# Patient Record
Sex: FEMALE | Race: WHITE | HISPANIC OR LATINO | Employment: PART TIME | URBAN - METROPOLITAN AREA
[De-identification: names, ages, dates, MRNs, and addresses within clinical notes are randomized per-mention and may not be internally consistent; named-entity substitution may affect disease eponyms.]

---

## 2017-02-02 ENCOUNTER — ALLSCRIPTS OFFICE VISIT (OUTPATIENT)
Dept: OTHER | Facility: OTHER | Age: 47
End: 2017-02-02

## 2017-02-02 DIAGNOSIS — R00.2 PALPITATIONS: ICD-10-CM

## 2017-02-02 DIAGNOSIS — M79.10 MYALGIA: ICD-10-CM

## 2017-02-02 DIAGNOSIS — E55.9 VITAMIN D DEFICIENCY: ICD-10-CM

## 2017-02-03 ENCOUNTER — APPOINTMENT (OUTPATIENT)
Dept: LAB | Facility: HOSPITAL | Age: 47
End: 2017-02-03

## 2017-02-03 ENCOUNTER — GENERIC CONVERSION - ENCOUNTER (OUTPATIENT)
Dept: OTHER | Facility: OTHER | Age: 47
End: 2017-02-03

## 2017-02-03 ENCOUNTER — TRANSCRIBE ORDERS (OUTPATIENT)
Dept: LAB | Facility: HOSPITAL | Age: 47
End: 2017-02-03

## 2017-02-03 DIAGNOSIS — R00.2 PALPITATIONS: ICD-10-CM

## 2017-02-03 DIAGNOSIS — E55.9 VITAMIN D DEFICIENCY: ICD-10-CM

## 2017-02-03 DIAGNOSIS — M79.10 MYALGIA: ICD-10-CM

## 2017-02-03 LAB
25(OH)D3 SERPL-MCNC: 15.4 NG/ML (ref 30–100)
ALBUMIN SERPL BCP-MCNC: 3.7 G/DL (ref 3.5–5)
ALP SERPL-CCNC: 85 U/L (ref 46–116)
ALT SERPL W P-5'-P-CCNC: 26 U/L (ref 12–78)
ANION GAP SERPL CALCULATED.3IONS-SCNC: 6 MMOL/L (ref 4–13)
AST SERPL W P-5'-P-CCNC: 14 U/L (ref 5–45)
BASOPHILS # BLD AUTO: 0.03 THOUSANDS/ΜL (ref 0–0.1)
BASOPHILS NFR BLD AUTO: 0 % (ref 0–1)
BILIRUB SERPL-MCNC: 0.31 MG/DL (ref 0.2–1)
BUN SERPL-MCNC: 10 MG/DL (ref 5–25)
CALCIUM SERPL-MCNC: 8.4 MG/DL (ref 8.3–10.1)
CHLORIDE SERPL-SCNC: 103 MMOL/L (ref 100–108)
CO2 SERPL-SCNC: 28 MMOL/L (ref 21–32)
CREAT SERPL-MCNC: 0.57 MG/DL (ref 0.6–1.3)
EOSINOPHIL # BLD AUTO: 0.14 THOUSAND/ΜL (ref 0–0.61)
EOSINOPHIL NFR BLD AUTO: 2 % (ref 0–6)
ERYTHROCYTE [DISTWIDTH] IN BLOOD BY AUTOMATED COUNT: 14.3 % (ref 11.6–15.1)
GFR SERPL CREATININE-BSD FRML MDRD: >60 ML/MIN/1.73SQ M
GLUCOSE SERPL-MCNC: 89 MG/DL (ref 65–140)
HCT VFR BLD AUTO: 36.8 % (ref 34.8–46.1)
HGB BLD-MCNC: 12 G/DL (ref 11.5–15.4)
LYMPHOCYTES # BLD AUTO: 1.72 THOUSANDS/ΜL (ref 0.6–4.47)
LYMPHOCYTES NFR BLD AUTO: 22 % (ref 14–44)
MAGNESIUM SERPL-MCNC: 2 MG/DL (ref 1.6–2.6)
MCH RBC QN AUTO: 24.7 PG (ref 26.8–34.3)
MCHC RBC AUTO-ENTMCNC: 32.6 G/DL (ref 31.4–37.4)
MCV RBC AUTO: 76 FL (ref 82–98)
MONOCYTES # BLD AUTO: 0.4 THOUSAND/ΜL (ref 0.17–1.22)
MONOCYTES NFR BLD AUTO: 5 % (ref 4–12)
NEUTROPHILS # BLD AUTO: 5.7 THOUSANDS/ΜL (ref 1.85–7.62)
NEUTS SEG NFR BLD AUTO: 71 % (ref 43–75)
NRBC BLD AUTO-RTO: 0 /100 WBCS
PLATELET # BLD AUTO: 231 THOUSANDS/UL (ref 149–390)
PMV BLD AUTO: 11.1 FL (ref 8.9–12.7)
POTASSIUM SERPL-SCNC: 3.9 MMOL/L (ref 3.5–5.3)
PROT SERPL-MCNC: 7.7 G/DL (ref 6.4–8.2)
RBC # BLD AUTO: 4.86 MILLION/UL (ref 3.81–5.12)
SODIUM SERPL-SCNC: 137 MMOL/L (ref 136–145)
TSH SERPL DL<=0.05 MIU/L-ACNC: 2.51 UIU/ML (ref 0.36–3.74)
WBC # BLD AUTO: 8.01 THOUSAND/UL (ref 4.31–10.16)

## 2017-02-03 PROCEDURE — 85025 COMPLETE CBC W/AUTO DIFF WBC: CPT

## 2017-02-03 PROCEDURE — 83735 ASSAY OF MAGNESIUM: CPT

## 2017-02-03 PROCEDURE — 36415 COLL VENOUS BLD VENIPUNCTURE: CPT

## 2017-02-03 PROCEDURE — 80053 COMPREHEN METABOLIC PANEL: CPT

## 2017-02-03 PROCEDURE — 84443 ASSAY THYROID STIM HORMONE: CPT

## 2017-02-03 PROCEDURE — 82306 VITAMIN D 25 HYDROXY: CPT

## 2017-02-13 ENCOUNTER — HOSPITAL ENCOUNTER (OUTPATIENT)
Dept: NON INVASIVE DIAGNOSTICS | Facility: HOSPITAL | Age: 47
End: 2017-02-13

## 2017-02-13 DIAGNOSIS — R00.2 PALPITATIONS: ICD-10-CM

## 2017-05-09 DIAGNOSIS — K21.9 GASTRO-ESOPHAGEAL REFLUX DISEASE WITHOUT ESOPHAGITIS: ICD-10-CM

## 2017-05-09 DIAGNOSIS — E55.9 VITAMIN D DEFICIENCY: ICD-10-CM

## 2017-05-09 DIAGNOSIS — I10 ESSENTIAL (PRIMARY) HYPERTENSION: ICD-10-CM

## 2017-05-09 DIAGNOSIS — D50.9 IRON DEFICIENCY ANEMIA: ICD-10-CM

## 2017-05-09 DIAGNOSIS — E66.9 OBESITY: ICD-10-CM

## 2017-05-09 DIAGNOSIS — E78.5 HYPERLIPIDEMIA: ICD-10-CM

## 2017-05-09 DIAGNOSIS — R25.2 CRAMP AND SPASM: ICD-10-CM

## 2017-05-09 DIAGNOSIS — R00.2 PALPITATIONS: ICD-10-CM

## 2017-05-10 ENCOUNTER — ALLSCRIPTS OFFICE VISIT (OUTPATIENT)
Dept: OTHER | Facility: OTHER | Age: 47
End: 2017-05-10

## 2017-06-10 ENCOUNTER — TRANSCRIBE ORDERS (OUTPATIENT)
Dept: LAB | Facility: HOSPITAL | Age: 47
End: 2017-06-10

## 2017-06-10 ENCOUNTER — APPOINTMENT (OUTPATIENT)
Dept: LAB | Facility: HOSPITAL | Age: 47
End: 2017-06-10
Payer: COMMERCIAL

## 2017-06-10 DIAGNOSIS — R00.2 PALPITATIONS: ICD-10-CM

## 2017-06-10 DIAGNOSIS — K21.9 GASTRO-ESOPHAGEAL REFLUX DISEASE WITHOUT ESOPHAGITIS: ICD-10-CM

## 2017-06-10 DIAGNOSIS — E55.9 VITAMIN D DEFICIENCY: ICD-10-CM

## 2017-06-10 DIAGNOSIS — R25.2 CRAMP AND SPASM: ICD-10-CM

## 2017-06-10 DIAGNOSIS — I10 ESSENTIAL (PRIMARY) HYPERTENSION: ICD-10-CM

## 2017-06-10 DIAGNOSIS — D50.9 IRON DEFICIENCY ANEMIA: ICD-10-CM

## 2017-06-10 DIAGNOSIS — E78.5 HYPERLIPIDEMIA: ICD-10-CM

## 2017-06-10 DIAGNOSIS — E66.9 OBESITY: ICD-10-CM

## 2017-06-10 LAB
ALBUMIN SERPL BCP-MCNC: 3.6 G/DL (ref 3.5–5)
ALP SERPL-CCNC: 89 U/L (ref 46–116)
ALT SERPL W P-5'-P-CCNC: 24 U/L (ref 12–78)
ANION GAP SERPL CALCULATED.3IONS-SCNC: 8 MMOL/L (ref 4–13)
AST SERPL W P-5'-P-CCNC: 17 U/L (ref 5–45)
BASOPHILS # BLD AUTO: 0.02 THOUSANDS/ΜL (ref 0–0.1)
BASOPHILS NFR BLD AUTO: 0 % (ref 0–1)
BILIRUB SERPL-MCNC: 0.27 MG/DL (ref 0.2–1)
BUN SERPL-MCNC: 12 MG/DL (ref 5–25)
CALCIUM SERPL-MCNC: 8.3 MG/DL (ref 8.3–10.1)
CHLORIDE SERPL-SCNC: 104 MMOL/L (ref 100–108)
CHOLEST SERPL-MCNC: 227 MG/DL (ref 50–200)
CO2 SERPL-SCNC: 26 MMOL/L (ref 21–32)
CREAT SERPL-MCNC: 0.58 MG/DL (ref 0.6–1.3)
EOSINOPHIL # BLD AUTO: 0.13 THOUSAND/ΜL (ref 0–0.61)
EOSINOPHIL NFR BLD AUTO: 2 % (ref 0–6)
ERYTHROCYTE [DISTWIDTH] IN BLOOD BY AUTOMATED COUNT: 14.4 % (ref 11.6–15.1)
EST. AVERAGE GLUCOSE BLD GHB EST-MCNC: 114 MG/DL
FERRITIN SERPL-MCNC: 11 NG/ML (ref 8–388)
GFR SERPL CREATININE-BSD FRML MDRD: >60 ML/MIN/1.73SQ M
GLUCOSE P FAST SERPL-MCNC: 96 MG/DL (ref 65–99)
HBA1C MFR BLD: 5.6 % (ref 4.2–6.3)
HCT VFR BLD AUTO: 36 % (ref 34.8–46.1)
HDLC SERPL-MCNC: 45 MG/DL (ref 40–60)
HGB BLD-MCNC: 11.9 G/DL (ref 11.5–15.4)
LDLC SERPL CALC-MCNC: 150 MG/DL (ref 0–100)
LYMPHOCYTES # BLD AUTO: 1.66 THOUSANDS/ΜL (ref 0.6–4.47)
LYMPHOCYTES NFR BLD AUTO: 24 % (ref 14–44)
MCH RBC QN AUTO: 25.3 PG (ref 26.8–34.3)
MCHC RBC AUTO-ENTMCNC: 33.1 G/DL (ref 31.4–37.4)
MCV RBC AUTO: 77 FL (ref 82–98)
MONOCYTES # BLD AUTO: 0.31 THOUSAND/ΜL (ref 0.17–1.22)
MONOCYTES NFR BLD AUTO: 4 % (ref 4–12)
NEUTROPHILS # BLD AUTO: 4.88 THOUSANDS/ΜL (ref 1.85–7.62)
NEUTS SEG NFR BLD AUTO: 70 % (ref 43–75)
NRBC BLD AUTO-RTO: 0 /100 WBCS
PLATELET # BLD AUTO: 224 THOUSANDS/UL (ref 149–390)
PMV BLD AUTO: 10.2 FL (ref 8.9–12.7)
POTASSIUM SERPL-SCNC: 3.7 MMOL/L (ref 3.5–5.3)
PROT SERPL-MCNC: 7.5 G/DL (ref 6.4–8.2)
RBC # BLD AUTO: 4.7 MILLION/UL (ref 3.81–5.12)
SODIUM SERPL-SCNC: 138 MMOL/L (ref 136–145)
TRIGL SERPL-MCNC: 160 MG/DL
WBC # BLD AUTO: 7.02 THOUSAND/UL (ref 4.31–10.16)

## 2017-06-10 PROCEDURE — 85025 COMPLETE CBC W/AUTO DIFF WBC: CPT

## 2017-06-10 PROCEDURE — 83036 HEMOGLOBIN GLYCOSYLATED A1C: CPT

## 2017-06-10 PROCEDURE — 80053 COMPREHEN METABOLIC PANEL: CPT

## 2017-06-10 PROCEDURE — 36415 COLL VENOUS BLD VENIPUNCTURE: CPT

## 2017-06-10 PROCEDURE — 80061 LIPID PANEL: CPT

## 2017-06-10 PROCEDURE — 82728 ASSAY OF FERRITIN: CPT

## 2017-07-05 ENCOUNTER — ALLSCRIPTS OFFICE VISIT (OUTPATIENT)
Dept: OTHER | Facility: OTHER | Age: 47
End: 2017-07-05

## 2017-10-07 ENCOUNTER — APPOINTMENT (OUTPATIENT)
Dept: LAB | Facility: HOSPITAL | Age: 47
End: 2017-10-07
Payer: COMMERCIAL

## 2017-10-07 ENCOUNTER — TRANSCRIBE ORDERS (OUTPATIENT)
Dept: LAB | Facility: HOSPITAL | Age: 47
End: 2017-10-07

## 2017-10-07 DIAGNOSIS — E66.9 LIFELONG OBESITY: ICD-10-CM

## 2017-10-07 DIAGNOSIS — E78.5 HYPERLIPIDEMIA, UNSPECIFIED HYPERLIPIDEMIA TYPE: ICD-10-CM

## 2017-10-07 DIAGNOSIS — R00.2 PALPITATIONS: ICD-10-CM

## 2017-10-07 DIAGNOSIS — I10 ESSENTIAL HYPERTENSION, MALIGNANT: ICD-10-CM

## 2017-10-07 DIAGNOSIS — R25.2 CRAMP OF LIMB: ICD-10-CM

## 2017-10-07 DIAGNOSIS — K21.9 GASTROESOPHAGEAL REFLUX DISEASE, ESOPHAGITIS PRESENCE NOT SPECIFIED: Primary | ICD-10-CM

## 2017-10-07 DIAGNOSIS — D50.9 NORMOCYTIC HYPOCHROMIC ANEMIA: ICD-10-CM

## 2017-10-07 DIAGNOSIS — K21.9 GASTROESOPHAGEAL REFLUX DISEASE, ESOPHAGITIS PRESENCE NOT SPECIFIED: ICD-10-CM

## 2017-10-07 DIAGNOSIS — E55.9 AVITAMINOSIS D: ICD-10-CM

## 2017-10-07 LAB
ALBUMIN SERPL BCP-MCNC: 3.7 G/DL (ref 3.5–5)
ALP SERPL-CCNC: 94 U/L (ref 46–116)
ALT SERPL W P-5'-P-CCNC: 23 U/L (ref 12–78)
ANION GAP SERPL CALCULATED.3IONS-SCNC: 7 MMOL/L (ref 4–13)
AST SERPL W P-5'-P-CCNC: 13 U/L (ref 5–45)
BASOPHILS # BLD AUTO: 0.02 THOUSANDS/ΜL (ref 0–0.1)
BASOPHILS NFR BLD AUTO: 0 % (ref 0–1)
BILIRUB SERPL-MCNC: 0.32 MG/DL (ref 0.2–1)
BUN SERPL-MCNC: 12 MG/DL (ref 5–25)
CALCIUM SERPL-MCNC: 8.4 MG/DL (ref 8.3–10.1)
CHLORIDE SERPL-SCNC: 104 MMOL/L (ref 100–108)
CHOLEST SERPL-MCNC: 232 MG/DL (ref 50–200)
CO2 SERPL-SCNC: 25 MMOL/L (ref 21–32)
CREAT SERPL-MCNC: 0.6 MG/DL (ref 0.6–1.3)
EOSINOPHIL # BLD AUTO: 0.11 THOUSAND/ΜL (ref 0–0.61)
EOSINOPHIL NFR BLD AUTO: 1 % (ref 0–6)
ERYTHROCYTE [DISTWIDTH] IN BLOOD BY AUTOMATED COUNT: 14.4 % (ref 11.6–15.1)
EST. AVERAGE GLUCOSE BLD GHB EST-MCNC: 120 MG/DL
FERRITIN SERPL-MCNC: 12 NG/ML (ref 8–388)
GFR SERPL CREATININE-BSD FRML MDRD: 111 ML/MIN/1.73SQ M
GLUCOSE P FAST SERPL-MCNC: 92 MG/DL (ref 65–99)
HBA1C MFR BLD: 5.8 % (ref 4.2–6.3)
HCT VFR BLD AUTO: 37.8 % (ref 34.8–46.1)
HDLC SERPL-MCNC: 46 MG/DL (ref 40–60)
HGB BLD-MCNC: 12.2 G/DL (ref 11.5–15.4)
LDLC SERPL CALC-MCNC: 157 MG/DL (ref 0–100)
LYMPHOCYTES # BLD AUTO: 1.77 THOUSANDS/ΜL (ref 0.6–4.47)
LYMPHOCYTES NFR BLD AUTO: 23 % (ref 14–44)
MCH RBC QN AUTO: 24.9 PG (ref 26.8–34.3)
MCHC RBC AUTO-ENTMCNC: 32.3 G/DL (ref 31.4–37.4)
MCV RBC AUTO: 77 FL (ref 82–98)
MONOCYTES # BLD AUTO: 0.34 THOUSAND/ΜL (ref 0.17–1.22)
MONOCYTES NFR BLD AUTO: 4 % (ref 4–12)
NEUTROPHILS # BLD AUTO: 5.6 THOUSANDS/ΜL (ref 1.85–7.62)
NEUTS SEG NFR BLD AUTO: 72 % (ref 43–75)
NRBC BLD AUTO-RTO: 0 /100 WBCS
PLATELET # BLD AUTO: 226 THOUSANDS/UL (ref 149–390)
PMV BLD AUTO: 10.5 FL (ref 8.9–12.7)
POTASSIUM SERPL-SCNC: 3.9 MMOL/L (ref 3.5–5.3)
PROT SERPL-MCNC: 7.8 G/DL (ref 6.4–8.2)
RBC # BLD AUTO: 4.89 MILLION/UL (ref 3.81–5.12)
SODIUM SERPL-SCNC: 136 MMOL/L (ref 136–145)
TRIGL SERPL-MCNC: 146 MG/DL
WBC # BLD AUTO: 7.87 THOUSAND/UL (ref 4.31–10.16)

## 2017-10-07 PROCEDURE — 80061 LIPID PANEL: CPT

## 2017-10-07 PROCEDURE — 83036 HEMOGLOBIN GLYCOSYLATED A1C: CPT

## 2017-10-07 PROCEDURE — 80053 COMPREHEN METABOLIC PANEL: CPT

## 2017-10-07 PROCEDURE — 82728 ASSAY OF FERRITIN: CPT

## 2017-10-07 PROCEDURE — 85025 COMPLETE CBC W/AUTO DIFF WBC: CPT

## 2017-10-07 PROCEDURE — 36415 COLL VENOUS BLD VENIPUNCTURE: CPT

## 2017-10-11 ENCOUNTER — ALLSCRIPTS OFFICE VISIT (OUTPATIENT)
Dept: OTHER | Facility: OTHER | Age: 47
End: 2017-10-11

## 2017-10-13 NOTE — PROGRESS NOTES
Assessment  1  Benign essential hypertension (401 1) (I10)   2  Obesity (278 00) (E66 9)   3  Hyperlipidemia (272 4) (E78 5)   · on lifestyle modifications, and will start omega 3  Wants to avoid statins at the moment  4  Iron deficiency anemia (280 9) (D50 9)   5  IFG (impaired fasting glucose) (790 21) (R73 01)    Plan  Acid reflux disease    · Omeprazole 20 MG Oral Capsule Delayed Release; TAKE ONE CAPSULE BY  MOUTH EVERY DAY AS NEEDED FOR REFLUX  Influenza vaccine needed    · Fluzone Quadrivalent 0 5 ML Intramuscular Suspension Prefilled Syringe  Myalgia    · Naproxen 500 MG Oral Tablet; take 1 tablet by mouth every 12 hours if needed  for pain with food  Obesity    · *1 - [facility]  *  Status: Active  Requested  for: 06GJW5355  Care Summary provided  : Yes    Discussion/Summary    Had a long discussion with patient about proper diet and exercise, encouraged weight loss and healthy habits  with patient other options for weight loss including medicines and Bariatric surgery  She is not interested in surgery at the moment but she is considering medications  refer patient to [facility] with management to see bariatric Dr Makenzie Lizarraga  flu shot today6 months  The patient was counseled regarding diagnostic results,-instructions for management,-risk factor reductions,-impressions,-risks and benefits of treatment options,-importance of compliance with treatment  total time of encounter was Thirty-five minutes  Possible side effects of new medications were reviewed with the patient/guardian today  The treatment plan was reviewed with the patient/guardian   The patient/guardian understands and agrees with the treatment plan      Chief Complaint  Patient here for 3 month follow up with labs would like the flu vac today      History of Present Illness  Patient presents for follow-up with labs  show prediabetes with an A1c of 5 8has continued to lose weightadmits not doing exercisedeficiency anemia is stable hemoglobin is normal despite stopping iron supplement due to GI upset  Ferritin is also improved from 6-12 now  She admits her menses recently are not heavy  has no acute issues or complaints, she states feeling well currently  Review of Systems    Constitutional: No fever, no chills, feels well, no tiredness, no recent weight gain or weight loss  Cardiovascular: No complaints of slow heart rate, no fast heart rate, no chest pain, no palpitations, no leg claudication, no lower extremity edema  Respiratory: No complaints of shortness of breath, no wheezing, no cough, no SOB on exertion, no orthopnea, no PND  Gastrointestinal: No complaints of abdominal pain, no constipation, no nausea or vomiting, no diarrhea, no bloody stools  Genitourinary: No complaints of dysuria, no incontinence, no pelvic pain, no dysmenorrhea, no vaginal discharge or bleeding  Musculoskeletal: No complaints of arthralgias, no myalgias, no joint swelling or stiffness, no limb pain or swelling  Active Problems  1  Acid reflux disease (530 81) (K21 9)   2  Benign essential hypertension (401 1) (I10)   3  Bilateral carpal tunnel syndrome (354 0) (G56 03)   4  Encounter for immunization (V03 89) (Z23)   5  Encounter for screening mammogram for malignant neoplasm of breast (V76 12)   (Z12 31)   6  External hemorrhoids (455 3) (K64 4)   7  Hand arthritis (716 94) (M19 049)   8  History of microcytic hypochromic anemia (V12 3) (Z86 2)   9  Hyperlipidemia (272 4) (E78 5)   10  Influenza vaccine needed (V04 81) (Z23)   11  Iron deficiency anemia (280 9) (D50 9)   12  Leg cramps (729 82) (R25 2)   13  Multiple acquired skin tags (701 9) (L91 8)   14  Myalgia (729 1) (M79 1)   15  Obesity (278 00) (E66 9)   16  Palpitations (785 1) (R00 2)   17  Plantar fasciitis, bilateral (728 71) (M72 2)   18  PND (post-nasal drip) (784 91) (R09 82)   19  Screening for diabetes mellitus (V77 1) (Z13 1)   20   Situational anxiety (300 09) (F41 8)   21  Vitamin D deficiency (268 9) (E55 9)   22  Well adult on routine health check (V70 0) (Z00 00)    Past Medical History  1  Acid reflux disease (530 81) (K21 9)   2  History of Acute upper respiratory infection (465 9) (J06 9)   3  History of Blood pressure elevated (401 9) (I10)   4  History of microcytic hypochromic anemia (V12 3) (Z86 2)   5  History of upper respiratory infection (V12 09) (Z87 09)   6  History of Impacted cerumen, unspecified laterality (380 4) (H61 20)   7  History of Joint pain, knee (719 46) (M25 569)   8  History of Limb pain (729 5) (M79 609)   9  History of Limb swelling (729 81) (M79 89)   10  History of Varicose Veins Of Lower Extremities (454 9)    The active problems and past medical history were reviewed and updated today  Surgical History  1  History of  Section    Family History  Mother    1  Family history of Diabetes Mellitus (V18 0)   2  Family history of Hypertension (V17 49)  Father    3  Family history of Leukemia (V16 6)  Paternal Aunt    3  Family history of Breast Cancer (V16 3)  Family History    5  Family history of malignant neoplasm of breast (V16 3) (Z80 3)    Social History   · History of Current Some Day Smoker (305 1)   ·    · Never a smoker   · Never Drank Alcohol   · Occupation: Homemaker  The social history was reviewed and is unchanged  Current Meds   1  Benazepril HCl - 5 MG Oral Tablet; TAKE 1 TABLET DAILY; Therapy: 50Ohf8650 to [phone])  Requested for: 86ODI0538; Last   MT:14AFE8482 Ordered   2  Fluticasone Propionate 50 MCG/ACT Nasal Suspension; use 2 sprays in each nostril   once daily; Therapy: 76TMR1477 to (Evaluate:12Eyk6296)  Requested for: 93Zza0499; Last   Rx:04Cfm1228 Ordered   3  Garcinia Cambogia-Chromium 500-200 MG-MCG Oral Tablet; Therapy: 11CBW6478 to Recorded   4  Naproxen 500 MG Oral Tablet; take 1 tablet by mouth every 12 hours if needed for pain   with food;    Therapy: 79KUA7563 to (Evaluate:50Cjm1077)  Requested for: 24Wpd2898; Last   Rx:01Jcq1200 Ordered   5  Omeprazole 20 MG Oral Capsule Delayed Release; TAKE ONE CAPSULE BY MOUTH   EVERY DAY AS NEEDED FOR REFLUX; Therapy: 29AWM7441 to (21 )  Requested for: 03UND8931; Last   II:13JJZ0802 Ordered   6  Ventolin  (90 Base) MCG/ACT Inhalation Aerosol Solution; Inhale 2 puffs every   4-6 hours as needed for wheeze/cough; Therapy: 43LSD9580 to (Last Rx:95Ywv8588)  Requested for: 62WBG4056 Ordered   7  Vitamin D 1000 UNIT Oral Tablet; TAKE 1 TABLET DAILY; Therapy: 83VOJ0711 to (Kris Arreguin)  Requested for: 13RZK9856; Last   Rx:23Zzn8204 Ordered    The medication list was reviewed and updated today  Allergies  1  Macrobid    Vitals  Vital Signs    Recorded: 76RXN8539 05:29PM   Temperature 98 4 F   Heart Rate 95   Respiration 16   Systolic 347   Diastolic 84   Height 5 ft 0 24 in   Weight 219 lb    BMI Calculated 42 43   BSA Calculated 1 95   O2 Saturation 98   Pain Scale 0     Physical Exam    Constitutional   General appearance: No acute distress, well appearing and well nourished  Pulmonary   Respiratory effort: No increased work of breathing or signs of respiratory distress  Auscultation of lungs: Clear to auscultation  Cardiovascular   Auscultation of heart: Normal rate and rhythm, normal S1 and S2, without murmurs  Examination of extremities for edema and/or varicosities: Normal     Musculoskeletal   Gait and station: Normal     Psychiatric   Mood and affect: Normal          Results/Data  (1) CBC/PLT/DIFF 07Oct2017 10:21AM Janice Ruff     Test Name Result Flag Reference   WBC COUNT 7 87 Thousand/uL  4 31-10 16   RBC COUNT 4 89 Million/uL  3 81-5 12   HEMOGLOBIN 12 2 g/dL  11 5-15 4   HEMATOCRIT 37 8 %  34 8-46  1   MCV 77 fL L 82-98   MCH 24 9 pg L 26 8-34 3   MCHC 32 3 g/dL  31 4-37 4   RDW 14 4 %  11 6-15 1   MPV 10 5 fL  8 9-12 7   PLATELET COUNT 513 Thousands/uL  149-390 nRBC AUTOMATED 0 /100 WBCs     NEUTROPHILS RELATIVE PERCENT 72 %  43-75   LYMPHOCYTES RELATIVE PERCENT 23 %  14-44   MONOCYTES RELATIVE PERCENT 4 %  4-12   EOSINOPHILS RELATIVE PERCENT 1 %  0-6   BASOPHILS RELATIVE PERCENT 0 %  0-1   NEUTROPHILS ABSOLUTE COUNT 5 60 Thousands/? ??L  1 85-7 62   LYMPHOCYTES ABSOLUTE COUNT 1 77 Thousands/? ??L  0 60-4 47   MONOCYTES ABSOLUTE COUNT 0 34 Thousand/? ??L  0 17-1 22   EOSINOPHILS ABSOLUTE COUNT 0 11 Thousand/? ??L  0 00-0 61   BASOPHILS ABSOLUTE COUNT 0 02 Thousands/? ??L  0 00-0 10   This is a patient instruction: This test is non-fasting  Please drink two glasses of water morning of bloodwork  (1) LIPID PANEL FASTING W DIRECT LDL REFLEX 07Oct2017 10:21AM Santi Valles     Test Name Result Flag Reference   CHOLESTEROL 232 mg/dL H    LDL CHOLESTEROL CALCULATED 157 mg/dL H 0-100   This is a patient instruction: This is a fasting test  Water, black tea or black coffee only after 9:00pm the night before the test  Drink 2 glasses of water the morning of the test         Triglyceride:        Normal <150 mg/dl   Borderline High 150-199 mg/dl   High 200-499 mg/dl   Very High >499 mg/dl      Cholesterol:       Desirable <200 mg/dl    Borderline High 200-239 mg/dl    High >239 mg/dl      HDL Cholesterol:       High>59 mg/dL    Low <41 mg/dL      HDL Cholesterol:       High>59 mg/dL    Low <41 mg/dL      This screening LDL is a calculated result  It does not have the accuracy of the Direct Measured LDL in the monitoring of patients with hyperlipidemia and/or statin therapy  Direct Measure LDL (OWG097) must be ordered separately in these patients  TRIGLYCERIDES 146 mg/dL  <=150   Specimen collection should occur prior to N-Acetylcysteine or Metamizole administration due to the potential for falsely depressed results  HDL,DIRECT 46 mg/dL  40-60   Specimen collection should occur prior to Metamizole administration due to the potential for falsley depressed results  (1) COMPREHENSIVE METABOLIC PANEL 52YZU4958 64:16UQ Andreialexi Krueger     Test Name Result Flag Reference   SODIUM 136 mmol/L  136-145   POTASSIUM 3 9 mmol/L  3 5-5 3   CHLORIDE 104 mmol/L  100-108   CARBON DIOXIDE 25 mmol/L  21-32   ANION GAP (CALC) 7 mmol/L  4-13   BLOOD UREA NITROGEN 12 mg/dL  5-25   CREATININE 0 60 mg/dL  0 60-1 30   Standardized to IDMS reference method   CALCIUM 8 4 mg/dL  8 3-10 1   BILI, TOTAL 0 32 mg/dL  0 20-1 00   ALK PHOSPHATAS 94 U/L     ALT (SGPT) 23 U/L  12-78   Specimen collection should occur prior to Sulfasalazine and/or Sulfapyridine administration due to the potential for falsely depressed results  AST(SGOT) 13 U/L  5-45   Specimen collection should occur prior to Sulfasalazine administration due to the potential for falsely depressed results  ALBUMIN 3 7 g/dL  3 5-5 0   TOTAL PROTEIN 7 8 g/dL  6 4-8 2   eGFR 111 ml/min/1 73sq [facility] [employer] Disease Education Program recommendations are as follows:  GFR calculation is accurate only with a steady state creatinine  Chronic Kidney disease less than 60 ml/min/1 73 sq  meters  Kidney failure less than 15 ml/min/1 73 sq  meters  GLUCOSE FASTING 92 mg/dL  65-99   Specimen collection should occur prior to Sulfasalazine administration due to the potential for falsely depressed results  Specimen collection should occur prior to Sulfapyridine administration due to the potential for falsely elevated results  (1) FERRITIN 15LKF9169 10:21AM Andreia Krueger     Test Name Result Flag Reference   FERRITIN 12 ng/mL  8-388     (1) HEMOGLOBIN A1C 25Mjj0210 10:21AM Janice Ruff     Test Name Result Flag Reference   HEMOGLOBIN A1C 5 8 %  4 2-6 3   EST  AVG  GLUCOSE 120 mg/dl         Future Appointments    Date/Time Provider Specialty Site   04/11/2018 05:30 PM OG Pan   Family Medicine [address]     Signatures   Electronically signed by : OG Zhao ; Oct 11 2017 7:13PM EST                       (Author)

## 2017-10-27 ENCOUNTER — ALLSCRIPTS OFFICE VISIT (OUTPATIENT)
Dept: OTHER | Facility: OTHER | Age: 47
End: 2017-10-27

## 2017-10-28 NOTE — PROGRESS NOTES
Assessment  1  Acute non-recurrent frontal sinusitis (461 1) (J01 10)   2  Bilateral impacted cerumen (380 4) (H61 23)    Plan  Acute non-recurrent frontal sinusitis    · Amoxicillin 500 MG Oral Tablet; TAKE 1 TABLET EVERY 12 HOURS UNTIL GONE   · Juvnlnkap-Ccgkkrpn-AT 30-2-10 MG/5ML Oral Syrup; TAKE 5 ML EVERY 4 TO 6  HOURS AS NEEDED  Acute non-recurrent frontal sinusitis, Bilateral impacted cerumen    · Removal Impacted Cerumen Requiring Instrumentation one or both ears - POC;  Status:Complete;   Done: 27Oct2017  Acute non-recurrent frontal sinusitis, PND (post-nasal drip)    · Fluticasone Propionate 50 MCG/ACT Nasal Suspension; use 2 sprays in each  nostril once daily    Discussion/Summary    Treat as above and see prnfor worsening sxs over the course of one week or not getting better after treatment/ reoccurring sxs after treatment  The patient was counseled regarding diagnostic results,-- instructions for management,-- risk factor reductions,-- prognosis,-- patient and family education,-- impressions,-- risks and benefits of treatment options,-- importance of compliance with treatment  Possible side effects of new medications were reviewed with the patient/guardian today  The treatment plan was reviewed with the patient/guardian  The patient/guardian understands and agrees with the treatment plan     Self Referrals: No      Chief Complaint  pt here for an acute visitsore throat and headache and dry coughing for 3 daysat a 7/10 today headache states that the Tessalon pills helped with cough       History of Present Illness  HPI: 2 days of URI sxs : cough, PND and stuffiness/ w cough and frontal HA 8/10 - pressure-liketessilon pearlsfever/ chillsreliefURI this month - no recent antibioiticin May      Review of Systems    Constitutional: feeling poorly-- and-- feeling tired, but-- No fever, no chills, feels well, no tiredness, no recent weight gain or loss,-- as noted in HPI,-- no fever-- and-- no chills  ENT: sore throat-- and-- nasal discharge, but-- no ear ache, no loss of hearing, no nosebleeds or nasal discharge, no sore throat or hoarseness-- and-- as noted in HPI  Cardiovascular: no complaints of slow or fast heart rate, no chest pain, no palpitations, no leg claudication or lower extremity edema  Respiratory: cough-- and-- PND, but-- no complaints of shortness of breath, no wheezing, no dyspnea on exertion, no orthopnea or PND-- and-- as noted in HPI  Gastrointestinal: no complaints of abdominal pain, no constipation, no nausea or diarrhea, no vomiting, no bloody stools  Musculoskeletal: myalgias, but-- no arthralgias,-- no joint swelling,-- no limb pain,-- no joint stiffness-- and-- no limb swelling  Neurological: headache, but-- as noted in HPI  Active Problems  1  Acid reflux disease (530 81) (K21 9)   2  Benign essential hypertension (401 1) (I10)   3  Bilateral carpal tunnel syndrome (354 0) (G56 03)   4  Encounter for immunization (V03 89) (Z23)   5  Encounter for screening mammogram for malignant neoplasm of breast (V76 12)   (Z12 31)   6  External hemorrhoids (455 3) (K64 4)   7  Hand arthritis (716 94) (M19 049)   8  History of microcytic hypochromic anemia (V12 3) (Z86 2)   9  Hyperlipidemia (272 4) (E78 5)   10  IFG (impaired fasting glucose) (790 21) (R73 01)   11  Influenza vaccine needed (V04 81) (Z23)   12  Iron deficiency anemia (280 9) (D50 9)   13  Leg cramps (729 82) (R25 2)   14  Multiple acquired skin tags (701 9) (L91 8)   15  Myalgia (729 1) (M79 1)   16  Obesity (278 00) (E66 9)   17  Palpitations (785 1) (R00 2)   18  Plantar fasciitis, bilateral (728 71) (M72 2)   19  PND (post-nasal drip) (784 91) (R09 82)   20  Screening for diabetes mellitus (V77 1) (Z13 1)   21  Situational anxiety (300 09) (F41 8)   22  Vitamin D deficiency (268 9) (E55 9)   23  Well adult on routine health check (V70 0) (Z00 00)    Past Medical History  1   Acid reflux disease (530 81) (K21 9) Last   Rx:11Oct2017 Ordered   4  Omeprazole 20 MG Oral Capsule Delayed Release; TAKE ONE CAPSULE BY MOUTH   EVERY DAY AS NEEDED FOR REFLUX; Therapy: 10YAO8858 to (Evaluate:09Apr2018)  Requested for: 05BCL0047; Last   Rx:11Oct2017 Ordered   5  Ventolin  (90 Base) MCG/ACT Inhalation Aerosol Solution; Inhale 2 puffs every   4-6 hours as needed for wheeze/cough; Therapy: 20AUF2342 to (Last Rx:59Sbb8924)  Requested for: 46DRJ1208 Ordered   6  Vitamin D 1000 UNIT Oral Tablet; TAKE 1 TABLET DAILY; Therapy: 95VIQ3322 to (Elvia Velazco)  Requested for: 58GVX7382; Last   Rx:42Jxq2890 Ordered    The medication list was reviewed and updated today  Allergies  1  Macrobid    Vitals   Recorded: 13FUJ5558 12:23PM   Temperature 98 5 F   Heart Rate 91   Systolic 379   Diastolic 78   Height 5 ft    Weight 219 lb    BMI Calculated 42 77   BSA Calculated 1 94   O2 Saturation 98   Pain Scale 8     Physical Exam    Constitutional   General appearance: No acute distress, well appearing and well nourished  Eyes   Conjunctiva and lids: No swelling, erythema or discharge  Pupils and irises: Equal, round and reactive to light  Ears, Nose, Mouth, and Throat   External inspection of ears and nose: Normal     Otoscopic examination: Tympanic membranes translucent with normal light reflex  Canals patent without erythema  Nasal mucosa, septum, and turbinates: Normal without edema or erythema  Oropharynx: Normal with no erythema, edema, exudate or lesions  The posterior pharynx pink  Inspection of the oropharynx showed visible hard and soft palate, upper portion of tonsils and uvula (Mallampati class 2)  The tonsils were normal  There was 1+ enlargement of both tonsils  Pulmonary   Respiratory effort: No increased work of breathing or signs of respiratory distress  Auscultation of lungs: Clear to auscultation  Cardiovascular   Palpation of heart: Normal PMI, no thrills      Auscultation of heart: Normal rate and rhythm, normal S1 and S2, without murmurs  Lymphatic   Palpation of lymph nodes in neck: No lymphadenopathy  Musculoskeletal   Gait and station: Normal     Skin   Skin and subcutaneous tissue: Normal without rashes or lesions  Neurologic   Cranial nerves: Cranial nerves 2-12 intact  Psychiatric   Orientation to person, place, and time: Normal     Mood and affect: Normal          Procedure            Procedure: cerumen removal    Indication: tympanic membrane(s) could not be visualized in both ears  Procedure Note: The procedure lasted 5 minute(s)  A otoscope was placed in the ear canal(s) to visualize the ear canal debris  The ear was cleaned by using a curette  The procedure was successful  Post-Procedure:   Patient Status: the patient tolerated the procedure well  Complications: there were no complications  Follow-up as needed  Attending Note  Collaborating Physician Note: Collaborating Note: I agree with the Advanced Practitioner note  Future Appointments    Date/Time Provider Specialty Site   04/11/2018 05:30 PM OG Sandoval 476     Signatures   Electronically signed by : Tammy Doe; Oct 27 2017  1:05PM EST                       (Author)    Electronically signed by : OG Davis ; Oct 27 2017  1:39PM EST                       (Author)

## 2017-11-10 ENCOUNTER — GENERIC CONVERSION - ENCOUNTER (OUTPATIENT)
Dept: OTHER | Facility: OTHER | Age: 47
End: 2017-11-10

## 2018-01-12 NOTE — RESULT NOTES
Message   labs are stabke except for low vit D at 15 4  I sent replacement to pharmacy take weekly dose of D2 x 2 months and daily calcium/ vit D3 tabs from now on  Verified Results  (1) COMPREHENSIVE METABOLIC PANEL 84ZWY3987 44:39OY Kirk Ortiz    Order Number: [id number]     Test Name Result Flag Reference   GLUCOSE,RANDM 89 mg/dL     If the patient is fasting, the ADA then defines impaired fasting glucose as > 100 mg/dL and diabetes as > or equal to 123 mg/dL  SODIUM 137 mmol/L  136-145   POTASSIUM 3 9 mmol/L  3 5-5 3   CHLORIDE 103 mmol/L  100-108   CARBON DIOXIDE 28 mmol/L  21-32   ANION GAP (CALC) 6 mmol/L  4-13   BLOOD UREA NITROGEN 10 mg/dL  5-25   CREATININE 0 57 mg/dL L 0 60-1 30   Standardized to IDMS reference method   CALCIUM 8 4 mg/dL  8 3-10 1   BILI, TOTAL 0 31 mg/dL  0 20-1 00   ALK PHOSPHATAS 85 U/L     ALT (SGPT) 26 U/L  12-78   AST(SGOT) 14 U/L  5-45   ALBUMIN 3 7 g/dL  3 5-5 0   TOTAL PROTEIN 7 7 g/dL  6 4-8 2   eGFR Non-African American      >60 0 ml/min/1 73sq [facility] Disease Education Program recommendations are as follows:  GFR calculation is accurate only with a steady state creatinine  Chronic Kidney disease less than 60 ml/min/1 73 sq  meters  Kidney failure less than 15 ml/min/1 73 sq  meters  (1) MAGNESIUM 79WJH5318 11:03AM Barber Schrader Order Number: [id number]     Test Name Result Flag Reference   MAGNESIUM 2 0 mg/dL  1 6-2 6     (1) TSH WITH FT4 REFLEX 12DIZ3115 11:03AM Kirk Ortiz    Order Number: [id number]     Test Name Result Flag Reference   TSH 2 510 uIU/mL  0 358-3 740   Patients undergoing fluorescein dye angiography may retain small amounts of fluorescein in the body for 48-72 hours post procedure  Samples containing fluorescein can produce falsely depressed TSH values  If the patient had this procedure,a specimen should be resubmitted post fluorescein clearance            The recommended reference ranges for TSH during pregnancy are as follows:  First trimester 0 1 to 2 5 uIU/mL  Second trimester  0 2 to 3 0 uIU/mL  Third trimester 0 3 to 3 0 uIU/m     (1) VITAMIN D 25-HYDROXY 21CNP7108 11:03AM Cadence Rivera    Order Number: [id number]     Test Name Result Flag Reference   VIT D 25-HYDROX 15 4 ng/mL L 30 0-100 0   This assay is a certified procedure of the CDC Vitamin D Standardization Certification Program (VDSCP)     Deficiency <20ng/ml   Insufficiency 20-30ng/ml   Sufficient  ng/ml     *Patients undergoing fluorescein dye angiography may retain small amounts of fluorescein in the body for 48-72 hours post procedure  Samples containing fluorescein can produce falsely elevated Vitamin D values  If the patient had this procedure, a specimen should be resubmitted post fluorescein clearance  (1) CBC/PLT/DIFF 33NVC3653 11:03AM [facility] Order Number: [id number]     Test Name Result Flag Reference   WBC COUNT 8 01 Thousand/uL  4 31-10 16   RBC COUNT 4 86 Million/uL  3 81-5 12   HEMOGLOBIN 12 0 g/dL  11 5-15 4   HEMATOCRIT 36 8 %  34 8-46  1   MCV 76 fL L 82-98   MCH 24 7 pg L 26 8-34 3   MCHC 32 6 g/dL  31 4-37 4   RDW 14 3 %  11 6-15 1   MPV 11 1 fL  8 9-12 7   PLATELET COUNT 108 Thousands/uL  149-390   nRBC AUTOMATED 0 /100 WBCs     NEUTROPHILS RELATIVE PERCENT 71 %  43-75   LYMPHOCYTES RELATIVE PERCENT 22 %  14-44   MONOCYTES RELATIVE PERCENT 5 %  4-12   EOSINOPHILS RELATIVE PERCENT 2 %  0-6   BASOPHILS RELATIVE PERCENT 0 %  0-1   NEUTROPHILS ABSOLUTE COUNT 5 70 Thousands/?L  1 85-7 62   LYMPHOCYTES ABSOLUTE COUNT 1 72 Thousands/?L  0 60-4 47   MONOCYTES ABSOLUTE COUNT 0 40 Thousand/?L  0 17-1 22   EOSINOPHILS ABSOLUTE COUNT 0 14 Thousand/?L  0 00-0 61   BASOPHILS ABSOLUTE COUNT 0 03 Thousands/?L  0 00-0 10   - Patient Instructions: This bloodwork is non-fasting  Please drink two glasses of water morning of bloodwork  - Patient Instructions:  This bloodwork is non-fasting  Please drink two glasses of water morning of bloodwork         Plan  Vitamin D deficiency    · Calcium 600 MG Oral Tablet; TAKE 1 TABLET DAILY   · Vitamin D (Ergocalciferol) 34145 UNIT Oral Capsule; TAKE 1 CAPSULE WEEKLY  X 8 WEEKS   · Vitamin D 1000 UNIT Oral Tablet; TAKE 1 TABLET DAILY

## 2018-01-13 VITALS
TEMPERATURE: 98.5 F | HEART RATE: 91 BPM | BODY MASS INDEX: 43 KG/M2 | DIASTOLIC BLOOD PRESSURE: 78 MMHG | HEIGHT: 60 IN | SYSTOLIC BLOOD PRESSURE: 120 MMHG | OXYGEN SATURATION: 98 % | WEIGHT: 219 LBS

## 2018-01-13 VITALS
SYSTOLIC BLOOD PRESSURE: 118 MMHG | WEIGHT: 206.13 LBS | BODY MASS INDEX: 40.47 KG/M2 | HEART RATE: 92 BPM | RESPIRATION RATE: 18 BRPM | OXYGEN SATURATION: 98 % | HEIGHT: 60 IN | DIASTOLIC BLOOD PRESSURE: 78 MMHG | TEMPERATURE: 98.7 F

## 2018-01-14 VITALS
WEIGHT: 219 LBS | HEIGHT: 60 IN | RESPIRATION RATE: 16 BRPM | BODY MASS INDEX: 43 KG/M2 | DIASTOLIC BLOOD PRESSURE: 84 MMHG | OXYGEN SATURATION: 98 % | HEART RATE: 95 BPM | TEMPERATURE: 98.4 F | SYSTOLIC BLOOD PRESSURE: 120 MMHG

## 2018-01-14 VITALS
SYSTOLIC BLOOD PRESSURE: 134 MMHG | HEART RATE: 91 BPM | RESPIRATION RATE: 16 BRPM | OXYGEN SATURATION: 98 % | BODY MASS INDEX: 42.01 KG/M2 | HEIGHT: 60 IN | WEIGHT: 214 LBS | DIASTOLIC BLOOD PRESSURE: 78 MMHG | TEMPERATURE: 98.6 F

## 2018-01-14 VITALS
SYSTOLIC BLOOD PRESSURE: 130 MMHG | WEIGHT: 214 LBS | TEMPERATURE: 98.9 F | RESPIRATION RATE: 16 BRPM | HEART RATE: 87 BPM | BODY MASS INDEX: 42.01 KG/M2 | DIASTOLIC BLOOD PRESSURE: 82 MMHG | OXYGEN SATURATION: 98 % | HEIGHT: 60 IN

## 2018-01-17 NOTE — PROGRESS NOTES
Assessment    1  Encounter for preventive health examination (V70 0) (Z00 00)   2  Benign essential hypertension (401 1) (I10)    Plan  Benign essential hypertension    · Benazepril HCl - 5 MG Oral Tablet; TAKE 1 TABLET DAILY  Bilateral impacted cerumen    · Acetic Acid 2 % Otic Solution; Instill 5-10 drops daily at bedtime for up to 10 days    Discussion/Summary  health maintenance visit Currently, she eats an adequate diet and has an adequate exercise regimen  Breast cancer screening: mammogram has been ordered  Advice and education were given regarding nutrition, aerobic exercise, weight loss, calcium supplements, vitamin D supplements, cardiovascular risk reduction, sunscreen use and self skin examination  Patient discussion: discussed with the patient  HM yearly  The patient was counseled regarding instructions for management, risk factor reductions, impressions, importance of compliance with treatment  total time of encounter was 30 minutes  Chief Complaint  pt here for health maintenance  Pt here for drivers exam physical  has scheduled mammogram for July 18, 2016  pt wants to talk to you about cholesterol medication she never received from you? History of Present Illness  , Adult Female: The patient is being seen for a health maintenance evaluation  General Health: The patient's health since the last visit is described as good  Lifestyle:  She consumes a diverse and healthy diet  She has weight concerns  Weight control issues: obesity  She exercises regularly  She uses tobacco  Tobacco Use Duration: 1 cig a day  She consumes alcohol  She reports occasional alcohol use  She denies drug use  Screening: cancer screening reviewed and current  metabolic screening reviewed and current  Review of Systems    Constitutional: No fever, no chills, feels well, no tiredness, no recent weight gain or weight loss     Cardiovascular: No complaints of slow heart rate, no fast heart rate, no chest pain, no palpitations, no leg claudication, no lower extremity edema  Respiratory: No complaints of shortness of breath, no wheezing, no cough, no SOB on exertion, no orthopnea, no PND  Gastrointestinal: No complaints of abdominal pain, no constipation, no nausea or vomiting, no diarrhea, no bloody stools  Genitourinary: No complaints of dysuria, no incontinence, no pelvic pain, no dysmenorrhea, no vaginal discharge or bleeding  Musculoskeletal: No complaints of arthralgias, no myalgias, no joint swelling or stiffness, no limb pain or swelling  Integumentary: No complaints of skin rash or lesions, no itching, no skin wounds, no breast pain or lump  Psychiatric: Not suicidal, no sleep disturbance, no anxiety or depression, no change in personality, no emotional problems  Active Problems    1  Acid reflux disease (530 81) (K21 9)   2  Benign essential hypertension (401 1) (I10)   3  Bilateral carpal tunnel syndrome (354 0) (G56 01,G56 02)   4  Bilateral impacted cerumen (380 4) (H61 23)   5  Encounter for immunization (V03 89) (Z23)   6  Encounter for screening mammogram for malignant neoplasm of breast (V76 12)   (Z12 31)   7  External hemorrhoids (455 3) (K64 4)   8  Hand arthritis (716 94) (M12 9)   9  History of microcytic hypochromic anemia (V12 3) (Z86 2)   10  Hyperlipidemia (272 4) (E78 5)   11  Myalgia (729 1) (M79 1)   12  Obesity (278 00) (E66 9)   13  Plantar fasciitis, bilateral (728 71) (M72 2)   14  PND (post-nasal drip) (784 91) (R09 82)   15  Screening for diabetes mellitus (V77 1) (Z13 1)   16  Vitamin D deficiency (268 9) (E55 9)   17   Well adult on routine health check (V70 0) (Z00 00)    Past Medical History    · Acid reflux disease (530 81) (K21 9)   · History of Acute upper respiratory infection (465 9) (J06 9)   · History of Blood pressure elevated (401 9) (I10)   · History of microcytic hypochromic anemia (V12 3) (Z86 2)   · History of upper respiratory infection (V12 09) (Z87 09)   · History of Impacted cerumen, unspecified laterality (380 4) (H61 20)   · History of Joint pain, knee (719 46) (M25 569)   · History of Limb pain (729 5) (M79 609)   · History of Limb swelling (729 81) (M79 89)   · History of Palpitations (785 1) (R00 2)   · History of Varicose Veins Of Lower Extremities (454 9)    Surgical History    · History of  Section    Family History  Mother    · Family history of Diabetes Mellitus (V18 0)   · Family history of Hypertension (V17 49)  Father    · Family history of Leukemia (V16 6)  Paternal Aunt    · Family history of Breast Cancer (V16 3)    Social History    · History of Current Some Day Smoker (305 1)   ·    · Never a smoker   · Never Drank Alcohol   · Occupation: Homemaker    Current Meds   1  Acetic Acid 2 % Otic Solution; Instill 5-10 drops daily at bedtime for up to 10 days; Therapy: 80ESD1398 to (Evaluate:29Pgw1545)  Requested for: 79VTE4959; Last   Rx:2016 Ordered   2  Benazepril HCl - 10 MG Oral Tablet; take 1 tablet by mouth every day; Therapy: 40Qxs4186 to (Evaluate:45Bpd8183)  Requested for: 2016; Last   Rx:2016 Ordered   3  Benzonatate 200 MG Oral Capsule; Take one three times a day as needed for cough; Therapy: 04RUP1593 to (Scott Burns)  Requested for: 2016; Last   Rx:2016 Ordered   4  Fluticasone Propionate 50 MCG/ACT Nasal Suspension; use 2 sprays in each nostril   once daily; Therapy: 71SGJ6812 to (Evaluate:31Mpn8094)  Requested for: 31ZJY8499; Last   Rx:70Gid3615 Ordered   5  Garcinia Cambogia-Chromium 500-200 MG-MCG Oral Tablet; Therapy: 64JVS3997 to Recorded   6  Hydrocortisone 2 5 % Rectal Cream; INSERT 1 APPLICATORFUL RECTALLY AT   BEDTIME AS NEEDED; Therapy: 80EBS7036 to (Last Rx:2016)  Requested for: 2016 Ordered   7  Naproxen 500 MG Oral Tablet; TAKE 1 TABLET EVERY 12 HOURS DAILY with food; Therapy: 21JSW2478 to (Evaluate:11Tni2573) Recorded   8   Omeprazole 20 MG Oral Capsule Delayed Release; TAKE ONE CAPSULE BY MOUTH   EVERY DAY AS NEEDED FOR REFLUX; Therapy: 52JMG6580 to (Evaluate:98Ocm9767)  Requested for: 28Apr2016; Last   Rx:28Apr2016 Ordered   9  Vitamin D3 5000 UNIT Oral Tablet; Take 1 tablet daily; Therapy: 29SGU9777 to (Last TR:48LPK6563)  Requested for: 63JKU3117 Ordered    Allergies    1  Macrobid    Vitals   Recorded: 56OAF0466 12:13PM   Temperature 98 4 F   Heart Rate 92   Systolic 116   Diastolic 68   Height 5 ft    Weight 202 lb 6 08 oz   BMI Calculated 39 52   BSA Calculated 1 87   O2 Saturation 98   Pain Scale 0     Physical Exam    Constitutional   General appearance: No acute distress, well appearing and well nourished  Eyes   Conjunctiva and lids: No swelling, erythema or discharge  Pupils and irises: Equal, round, reactive to light  Ears, Nose, Mouth, and Throat   Hearing: Normal     Neck   Neck: Supple, symmetric, trachea midline, no masses  Pulmonary   Respiratory effort: No increased work of breathing or signs of respiratory distress  Auscultation of lungs: Clear to auscultation  Cardiovascular   Auscultation of heart: Normal rate and rhythm, normal S1 and S2, no murmurs  Peripheral vascular exam: Normal     Examination of extremities for edema and/or varicosities: Normal     Chest   Breasts: Normal, no dimpling or skin changes appreciated  Palpation of breasts and axillae: Normal, no masses palpated  Chest: Normal     Abdomen   Abdomen: Non-tender, no masses  Genitourinary   External genitalia and vagina: Normal, no lesions appreciated  Lymphatic   Palpation of lymph nodes in neck: No lymphadenopathy  Musculoskeletal   Gait and station: Normal     Joints, bones, and muscles: Normal     Skin   Skin and subcutaneous tissue: Normal without rashes or lesions  Neurologic   Cranial nerves: Cranial nerves II-XII intact      Psychiatric   Judgment and insight: Normal     Mood and affect: Normal        Future Appointments    Date/Time Provider Specialty Site   09/29/2016 11:00 AM OG Vogel   [facility] [address]     Signatures   Electronically signed by : OG Shen ; Jun 29 2016  2:06PM EST                       (Author)

## 2018-01-22 VITALS
BODY MASS INDEX: 43 KG/M2 | WEIGHT: 219 LBS | TEMPERATURE: 98.5 F | OXYGEN SATURATION: 98 % | RESPIRATION RATE: 12 BRPM | HEART RATE: 112 BPM | DIASTOLIC BLOOD PRESSURE: 70 MMHG | SYSTOLIC BLOOD PRESSURE: 120 MMHG | HEIGHT: 60 IN

## 2018-02-01 ENCOUNTER — OFFICE VISIT (OUTPATIENT)
Dept: FAMILY MEDICINE CLINIC | Facility: CLINIC | Age: 48
End: 2018-02-01
Payer: COMMERCIAL

## 2018-02-01 ENCOUNTER — APPOINTMENT (OUTPATIENT)
Dept: LAB | Facility: HOSPITAL | Age: 48
End: 2018-02-01
Payer: COMMERCIAL

## 2018-02-01 VITALS
WEIGHT: 217 LBS | RESPIRATION RATE: 16 BRPM | SYSTOLIC BLOOD PRESSURE: 126 MMHG | OXYGEN SATURATION: 99 % | HEIGHT: 61 IN | TEMPERATURE: 98.4 F | HEART RATE: 81 BPM | BODY MASS INDEX: 40.97 KG/M2 | DIASTOLIC BLOOD PRESSURE: 70 MMHG

## 2018-02-01 DIAGNOSIS — M79.10 MYALGIA: ICD-10-CM

## 2018-02-01 DIAGNOSIS — N39.46 MIXED STRESS AND URGE URINARY INCONTINENCE: ICD-10-CM

## 2018-02-01 DIAGNOSIS — N39.46 MIXED STRESS AND URGE URINARY INCONTINENCE: Primary | ICD-10-CM

## 2018-02-01 DIAGNOSIS — M79.10 MUSCLE PAIN: ICD-10-CM

## 2018-02-01 DIAGNOSIS — N39.45 CONTINUOUS LEAKAGE OF URINE: Primary | ICD-10-CM

## 2018-02-01 DIAGNOSIS — N39.46 MIXED INCONTINENCE: ICD-10-CM

## 2018-02-01 PROBLEM — E66.01 MORBID OBESITY (HCC): Status: ACTIVE | Noted: 2017-10-26

## 2018-02-01 PROBLEM — E78.5 HYPERLIPEMIA: Status: ACTIVE | Noted: 2018-02-01

## 2018-02-01 PROBLEM — F41.8 SITUATIONAL ANXIETY: Status: ACTIVE | Noted: 2017-07-05

## 2018-02-01 PROBLEM — I10 HYPERTENSION: Status: ACTIVE | Noted: 2018-02-01

## 2018-02-01 PROBLEM — R73.01 IFG (IMPAIRED FASTING GLUCOSE): Status: ACTIVE | Noted: 2017-10-11

## 2018-02-01 LAB
ALBUMIN SERPL BCP-MCNC: 4 G/DL (ref 3.5–5)
ALP SERPL-CCNC: 90 U/L (ref 46–116)
ALT SERPL W P-5'-P-CCNC: 20 U/L (ref 12–78)
ANION GAP SERPL CALCULATED.3IONS-SCNC: 7 MMOL/L (ref 4–13)
AST SERPL W P-5'-P-CCNC: 13 U/L (ref 5–45)
BASOPHILS # BLD AUTO: 0.01 THOUSANDS/ΜL (ref 0–0.1)
BASOPHILS NFR BLD AUTO: 0 % (ref 0–1)
BILIRUB SERPL-MCNC: 0.36 MG/DL (ref 0.2–1)
BUN SERPL-MCNC: 16 MG/DL (ref 5–25)
CALCIUM SERPL-MCNC: 8.5 MG/DL (ref 8.3–10.1)
CHLORIDE SERPL-SCNC: 105 MMOL/L (ref 100–108)
CK SERPL-CCNC: 124 U/L (ref 26–192)
CO2 SERPL-SCNC: 26 MMOL/L (ref 21–32)
CREAT SERPL-MCNC: 0.56 MG/DL (ref 0.6–1.3)
CRP SERPL QL: 3.4 MG/L
EOSINOPHIL # BLD AUTO: 0.1 THOUSAND/ΜL (ref 0–0.61)
EOSINOPHIL NFR BLD AUTO: 1 % (ref 0–6)
ERYTHROCYTE [DISTWIDTH] IN BLOOD BY AUTOMATED COUNT: 14.3 % (ref 11.6–15.1)
GFR SERPL CREATININE-BSD FRML MDRD: 113 ML/MIN/1.73SQ M
GLUCOSE P FAST SERPL-MCNC: 86 MG/DL (ref 65–99)
HCT VFR BLD AUTO: 37.1 % (ref 34.8–46.1)
HGB BLD-MCNC: 12.1 G/DL (ref 11.5–15.4)
LYMPHOCYTES # BLD AUTO: 1.67 THOUSANDS/ΜL (ref 0.6–4.47)
LYMPHOCYTES NFR BLD AUTO: 21 % (ref 14–44)
MCH RBC QN AUTO: 25.3 PG (ref 26.8–34.3)
MCHC RBC AUTO-ENTMCNC: 32.6 G/DL (ref 31.4–37.4)
MCV RBC AUTO: 78 FL (ref 82–98)
MONOCYTES # BLD AUTO: 0.41 THOUSAND/ΜL (ref 0.17–1.22)
MONOCYTES NFR BLD AUTO: 5 % (ref 4–12)
NEUTROPHILS # BLD AUTO: 5.8 THOUSANDS/ΜL (ref 1.85–7.62)
NEUTS SEG NFR BLD AUTO: 73 % (ref 43–75)
NRBC BLD AUTO-RTO: 0 /100 WBCS
PLATELET # BLD AUTO: 229 THOUSANDS/UL (ref 149–390)
PMV BLD AUTO: 10.3 FL (ref 8.9–12.7)
POTASSIUM SERPL-SCNC: 3.8 MMOL/L (ref 3.5–5.3)
PROT SERPL-MCNC: 8.1 G/DL (ref 6.4–8.2)
RBC # BLD AUTO: 4.79 MILLION/UL (ref 3.81–5.12)
SL AMB  POCT GLUCOSE, UA: NEGATIVE
SL AMB LEUKOCYTE ESTERASE,UA: NEGATIVE
SL AMB POCT BILIRUBIN,UA: NEGATIVE
SL AMB POCT BLOOD,UA: ABNORMAL
SL AMB POCT CLARITY,UA: ABNORMAL
SL AMB POCT COLOR,UA: YELLOW
SL AMB POCT KETONES,UA: NEGATIVE
SL AMB POCT NITRITE,UA: NEGATIVE
SL AMB POCT PH,UA: 6
SL AMB POCT SPECIFIC GRAVITY,UA: 1.03
SODIUM SERPL-SCNC: 138 MMOL/L (ref 136–145)
TSH SERPL DL<=0.05 MIU/L-ACNC: 3.37 UIU/ML (ref 0.36–3.74)
WBC # BLD AUTO: 8.03 THOUSAND/UL (ref 4.31–10.16)

## 2018-02-01 PROCEDURE — 87086 URINE CULTURE/COLONY COUNT: CPT

## 2018-02-01 PROCEDURE — 36415 COLL VENOUS BLD VENIPUNCTURE: CPT | Performed by: FAMILY MEDICINE

## 2018-02-01 PROCEDURE — 81002 URINALYSIS NONAUTO W/O SCOPE: CPT

## 2018-02-01 PROCEDURE — 80053 COMPREHEN METABOLIC PANEL: CPT | Performed by: FAMILY MEDICINE

## 2018-02-01 PROCEDURE — 86038 ANTINUCLEAR ANTIBODIES: CPT

## 2018-02-01 PROCEDURE — 86430 RHEUMATOID FACTOR TEST QUAL: CPT

## 2018-02-01 PROCEDURE — 82550 ASSAY OF CK (CPK): CPT | Performed by: FAMILY MEDICINE

## 2018-02-01 PROCEDURE — 85025 COMPLETE CBC W/AUTO DIFF WBC: CPT | Performed by: FAMILY MEDICINE

## 2018-02-01 PROCEDURE — 82164 ANGIOTENSIN I ENZYME TEST: CPT | Performed by: FAMILY MEDICINE

## 2018-02-01 PROCEDURE — 86225 DNA ANTIBODY NATIVE: CPT | Performed by: FAMILY MEDICINE

## 2018-02-01 PROCEDURE — 86140 C-REACTIVE PROTEIN: CPT | Performed by: FAMILY MEDICINE

## 2018-02-01 PROCEDURE — 99214 OFFICE O/P EST MOD 30 MIN: CPT | Performed by: FAMILY MEDICINE

## 2018-02-01 PROCEDURE — 84443 ASSAY THYROID STIM HORMONE: CPT | Performed by: FAMILY MEDICINE

## 2018-02-01 RX ORDER — OMEPRAZOLE 20 MG/1
CAPSULE, DELAYED RELEASE ORAL
COMMUNITY
Start: 2014-11-10

## 2018-02-01 RX ORDER — NAPROXEN 500 MG/1
TABLET ORAL
COMMUNITY
Start: 2016-01-28 | End: 2018-08-21

## 2018-02-01 RX ORDER — ATORVASTATIN CALCIUM 20 MG/1
TABLET, FILM COATED ORAL
COMMUNITY
Start: 2016-04-28 | End: 2019-01-11 | Stop reason: SDUPTHER

## 2018-02-01 RX ORDER — FLUTICASONE PROPIONATE 50 MCG
2 SPRAY, SUSPENSION (ML) NASAL DAILY
COMMUNITY
Start: 2015-03-27

## 2018-02-01 RX ORDER — TIZANIDINE 2 MG/1
4 TABLET ORAL EVERY 8 HOURS PRN
Qty: 30 TABLET | Refills: 0 | Status: SHIPPED | OUTPATIENT
Start: 2018-02-01 | End: 2018-08-21 | Stop reason: SDUPTHER

## 2018-02-01 RX ORDER — OXYBUTYNIN CHLORIDE 5 MG/1
5 TABLET, EXTENDED RELEASE ORAL DAILY
Qty: 30 TABLET | Refills: 0 | Status: SHIPPED | OUTPATIENT
Start: 2018-02-01

## 2018-02-01 RX ORDER — BENAZEPRIL HYDROCHLORIDE 5 MG/1
1 TABLET, FILM COATED ORAL DAILY
COMMUNITY
Start: 2013-09-03 | End: 2018-07-11 | Stop reason: SDUPTHER

## 2018-02-01 RX ORDER — ALBUTEROL SULFATE 90 UG/1
AEROSOL, METERED RESPIRATORY (INHALATION)
COMMUNITY
Start: 2017-05-10

## 2018-02-01 NOTE — PROGRESS NOTES
Assessment/Plan:    No problem-specific Assessment & Plan notes found for this encounter  Diagnoses and all orders for this visit:    Mixed stress and urge urinary incontinence  -     Urine culture; Future  -     oxybutynin (DITROPAN XL) 5 mg 24 hr tablet; Take 1 tablet (5 mg total) by mouth daily    Myalgia  -     Angiotensin converting enzyme; Future  -     CBC and differential; Future  -     CK; Future  -     Angiotensin converting enzyme  -     CBC and differential  -     CK    Muscle pain  -     REUBEN; Future  -     Angiotensin converting enzyme; Future  -     Anti-DNA antibody, double-stranded; Future  -     C-reactive protein; Future  -     CBC and differential; Future  -     CK; Future  -     Comprehensive metabolic panel; Future  -     Rheumatoid factor; Future  -     TSH, 3rd generation with T4 reflex; Future  -     tiZANidine (ZANAFLEX) 2 mg tablet; Take 2 tablets (4 mg total) by mouth every 8 (eight) hours as needed for muscle spasms  -     Angiotensin converting enzyme  -     Anti-DNA antibody, double-stranded  -     C-reactive protein  -     CBC and differential  -     CK  -     Comprehensive metabolic panel  -     TSH, 3rd generation with T4 reflex    Mixed incontinence    Other orders  -     omeprazole (PriLOSEC) 20 mg delayed release capsule; Take by mouth  -     naproxen (NAPROSYN) 500 mg tablet; Take by mouth  -     fluticasone (FLONASE) 50 mcg/act nasal spray; 2 sprays into each nostril daily  -     benazepril (LOTENSIN) 5 mg tablet; Take 1 tablet by mouth daily  -     atorvastatin (LIPITOR) 20 mg tablet;   -     albuterol (VENTOLIN HFA) 90 mcg/act inhaler; Inhale        Subjective:   Pt here for an acute visit  Complaining of muscle aches all over body for some time on/off  The pass 4 days has worsened  Pt also complaining of urinary incontinence leakage for about 3 weeks      Pt has order for mammogram and has yet to schedule  Pt has had her yearly exam at GYN no pap was needed til 2019 Patient ID: Xiang Mary is a 41 y o  female  HPI Pt presents for to complians  1-Patient presents for 3 weeks of  Body aches all over  she mostly reports myalgia  no recent changes in her activities or any  Eliciting factors  She took naproxen 500mg q12h and helped  No fevers, no chills  She has been having this problem now fr about a year but for the last 3 weeks she has noted worsening and 5 days ago her pain was higher than usual  She usually experiences 5/10 but that time she had 8/10 all over  2-She has 2 weeks of urinary frequency and incontinence  Denies dysuria, changes in color, no blood in urine  She had 3 kid 1 vaginal, 2   She also has incontinence when she cough, or sneezing  She is UTD with PAP and had her annual pelvic exam 3 months ago and per pt normal          The following portions of the patient's history were reviewed and updated as appropriate: allergies, current medications, past family history, past medical history, past social history, past surgical history and problem list     Review of Systems   Constitutional: Negative  Negative for chills and fever  HENT: Negative  Respiratory: Negative  Cardiovascular: Negative  Genitourinary: Positive for frequency  Negative for dysuria, flank pain and pelvic pain  Musculoskeletal: Positive for myalgias  Psychiatric/Behavioral: Negative  Objective:   /70   Pulse 81   Temp 98 4 °F (36 9 °C)   Resp 16   Ht 5' 0 63" (1 54 m)   Wt 98 4 kg (217 lb)   SpO2 99%   BMI 41 50 kg/m²      Physical Exam   Constitutional: She is oriented to person, place, and time  She appears well-developed and well-nourished  Eyes: Conjunctivae and EOM are normal    Cardiovascular: Normal rate, regular rhythm and normal heart sounds  Pulmonary/Chest: Effort normal and breath sounds normal    Musculoskeletal: Normal range of motion  She exhibits tenderness  She exhibits no edema     Neurological: She is alert and oriented to person, place, and time  Psychiatric: She has a normal mood and affect   Her behavior is normal

## 2018-02-02 LAB
ACE SERPL-CCNC: 3 U/L (ref 14–82)
BACTERIA UR CULT: NORMAL
DSDNA AB SER-ACNC: <1 IU/ML (ref 0–9)
RHEUMATOID FACT SER QL LA: NEGATIVE
RYE IGE QN: NEGATIVE

## 2018-07-11 DIAGNOSIS — I10 HYPERTENSION: ICD-10-CM

## 2018-07-11 RX ORDER — BENAZEPRIL HYDROCHLORIDE 5 MG/1
TABLET, FILM COATED ORAL
Qty: 30 TABLET | Refills: 5 | Status: SHIPPED | OUTPATIENT
Start: 2018-07-11 | End: 2019-01-18 | Stop reason: SDUPTHER

## 2018-08-20 NOTE — PROGRESS NOTES
Assessment/Plan:    No problem-specific Assessment & Plan notes found for this encounter  Diagnoses and all orders for this visit:    Gastroesophageal reflux disease, esophagitis presence not specified  -     HEMOGLOBIN A1C W/ EAG ESTIMATION; Future  -     Basic metabolic panel; Future  -     CBC and differential; Future  -     Lipid Panel with Direct LDL reflex; Future    IFG (impaired fasting glucose)  -     HEMOGLOBIN A1C W/ EAG ESTIMATION; Future  -     Basic metabolic panel; Future  -     CBC and differential; Future  -     Lipid Panel with Direct LDL reflex; Future    Benign essential hypertension  -     HEMOGLOBIN A1C W/ EAG ESTIMATION; Future  -     Basic metabolic panel; Future  -     CBC and differential; Future  -     Lipid Panel with Direct LDL reflex; Future    Hyperlipidemia, unspecified hyperlipidemia type  -     HEMOGLOBIN A1C W/ EAG ESTIMATION; Future  -     Basic metabolic panel; Future  -     CBC and differential; Future  -     Lipid Panel with Direct LDL reflex; Future    Iron deficiency anemia, unspecified iron deficiency anemia type  -     HEMOGLOBIN A1C W/ EAG ESTIMATION; Future  -     Basic metabolic panel; Future  -     CBC and differential; Future  -     Lipid Panel with Direct LDL reflex; Future    Morbid obesity ([address] )  -     HEMOGLOBIN A1C W/ EAG ESTIMATION; Future  -     Basic metabolic panel; Future  -     Ambulatory referral to Weight Management; Future  -     CBC and differential; Future  -     Lipid Panel with Direct LDL reflex; Future    Muscle pain  -     tiZANidine (ZANAFLEX) 2 mg tablet; Take 2 tablets (4 mg total) by mouth every 8 (eight) hours as needed for muscle spasms  -     CBC and differential; Future  -     Lipid Panel with Direct LDL reflex;  Future    Other orders  -     amoxicillin (AMOXIL) 500 mg capsule;   -     HYDROcodone-acetaminophen (NORCO) 5-325 mg per tablet; 1 tablet every 6 (six) hours as needed for pain        fu 3 months    Subjective:   Pt here for follow up visit  Labs done same day as last visit, no pending labs  No new issues to discuss today     Patient ID: Melissa Stephens is a 41 y o  female  HPI  Pt present for chronic follow up  Last labs in feb  She has lost 5 lbs but mostly due to increase in her work load at her job  Not exercising, not following healthy diet  BMI 41  She continues with knee pain, leg pains, and overall generalized pains  BP is stable  The following portions of the patient's history were reviewed and updated as appropriate: allergies, current medications, past family history, past medical history, past social history, past surgical history and problem list     Review of Systems   Constitutional: Negative for activity change and appetite change  Respiratory: Negative  Cardiovascular: Negative  Gastrointestinal: Negative  Genitourinary: Negative  Musculoskeletal: Positive for arthralgias and myalgias  Skin: Negative for rash  Psychiatric/Behavioral: Negative  Objective:      /80   Pulse 85   Temp 98 8 °F (37 1 °C)   Ht 5' 0 43" (1 535 m)   Wt 96 6 kg (213 lb)   SpO2 98%   BMI 41 00 kg/m²          Physical Exam   Constitutional: She is oriented to person, place, and time  She appears well-developed and well-nourished  No distress  HENT:   Head: Normocephalic  Eyes: Conjunctivae are normal    Cardiovascular: Normal rate, regular rhythm and normal heart sounds  Pulmonary/Chest: Effort normal and breath sounds normal  No respiratory distress  She has no wheezes  She has no rales  Neurological: She is alert and oriented to person, place, and time  Psychiatric: She has a normal mood and affect   Her behavior is normal

## 2018-08-21 ENCOUNTER — OFFICE VISIT (OUTPATIENT)
Dept: FAMILY MEDICINE CLINIC | Facility: CLINIC | Age: 48
End: 2018-08-21
Payer: COMMERCIAL

## 2018-08-21 VITALS
DIASTOLIC BLOOD PRESSURE: 80 MMHG | WEIGHT: 213 LBS | HEART RATE: 85 BPM | BODY MASS INDEX: 41.82 KG/M2 | TEMPERATURE: 98.8 F | HEIGHT: 60 IN | SYSTOLIC BLOOD PRESSURE: 120 MMHG | OXYGEN SATURATION: 98 %

## 2018-08-21 DIAGNOSIS — R73.01 IFG (IMPAIRED FASTING GLUCOSE): ICD-10-CM

## 2018-08-21 DIAGNOSIS — E66.01 MORBID OBESITY (HCC): ICD-10-CM

## 2018-08-21 DIAGNOSIS — I10 BENIGN ESSENTIAL HYPERTENSION: ICD-10-CM

## 2018-08-21 DIAGNOSIS — K21.9 GASTROESOPHAGEAL REFLUX DISEASE, ESOPHAGITIS PRESENCE NOT SPECIFIED: Primary | ICD-10-CM

## 2018-08-21 DIAGNOSIS — D50.9 IRON DEFICIENCY ANEMIA, UNSPECIFIED IRON DEFICIENCY ANEMIA TYPE: ICD-10-CM

## 2018-08-21 DIAGNOSIS — E78.5 HYPERLIPIDEMIA, UNSPECIFIED HYPERLIPIDEMIA TYPE: ICD-10-CM

## 2018-08-21 DIAGNOSIS — M79.10 MUSCLE PAIN: ICD-10-CM

## 2018-08-21 PROCEDURE — 99214 OFFICE O/P EST MOD 30 MIN: CPT | Performed by: FAMILY MEDICINE

## 2018-08-21 PROCEDURE — 3079F DIAST BP 80-89 MM HG: CPT | Performed by: FAMILY MEDICINE

## 2018-08-21 PROCEDURE — 1036F TOBACCO NON-USER: CPT | Performed by: FAMILY MEDICINE

## 2018-08-21 PROCEDURE — 3008F BODY MASS INDEX DOCD: CPT | Performed by: FAMILY MEDICINE

## 2018-08-21 PROCEDURE — 3074F SYST BP LT 130 MM HG: CPT | Performed by: FAMILY MEDICINE

## 2018-08-21 RX ORDER — AMOXICILLIN 500 MG/1
CAPSULE ORAL
Refills: 0 | COMMUNITY
Start: 2018-08-06

## 2018-08-21 RX ORDER — TIZANIDINE 2 MG/1
4 TABLET ORAL EVERY 8 HOURS PRN
Qty: 30 TABLET | Refills: 1 | Status: SHIPPED | OUTPATIENT
Start: 2018-08-21 | End: 2019-01-18 | Stop reason: SDUPTHER

## 2018-08-21 RX ORDER — HYDROCODONE BITARTRATE AND ACETAMINOPHEN 5; 325 MG/1; MG/1
1 TABLET ORAL EVERY 6 HOURS PRN
Refills: 0 | COMMUNITY
Start: 2018-08-06

## 2019-01-10 ENCOUNTER — LAB (OUTPATIENT)
Dept: LAB | Facility: HOSPITAL | Age: 49
End: 2019-01-10
Payer: COMMERCIAL

## 2019-01-10 DIAGNOSIS — E78.5 HYPERLIPIDEMIA, UNSPECIFIED HYPERLIPIDEMIA TYPE: ICD-10-CM

## 2019-01-10 DIAGNOSIS — M79.10 MUSCLE PAIN: ICD-10-CM

## 2019-01-10 DIAGNOSIS — I10 BENIGN ESSENTIAL HYPERTENSION: ICD-10-CM

## 2019-01-10 DIAGNOSIS — D50.9 IRON DEFICIENCY ANEMIA, UNSPECIFIED IRON DEFICIENCY ANEMIA TYPE: ICD-10-CM

## 2019-01-10 DIAGNOSIS — R73.01 IFG (IMPAIRED FASTING GLUCOSE): ICD-10-CM

## 2019-01-10 DIAGNOSIS — E66.01 MORBID OBESITY (HCC): ICD-10-CM

## 2019-01-10 DIAGNOSIS — K21.9 GASTROESOPHAGEAL REFLUX DISEASE, ESOPHAGITIS PRESENCE NOT SPECIFIED: ICD-10-CM

## 2019-01-10 LAB
ANION GAP SERPL CALCULATED.3IONS-SCNC: 7 MMOL/L (ref 4–13)
BASOPHILS # BLD AUTO: 0.03 THOUSANDS/ΜL (ref 0–0.1)
BASOPHILS NFR BLD AUTO: 0 % (ref 0–1)
BUN SERPL-MCNC: 16 MG/DL (ref 5–25)
CALCIUM SERPL-MCNC: 8.3 MG/DL (ref 8.3–10.1)
CHLORIDE SERPL-SCNC: 104 MMOL/L (ref 100–108)
CHOLEST SERPL-MCNC: 229 MG/DL (ref 50–200)
CO2 SERPL-SCNC: 24 MMOL/L (ref 21–32)
CREAT SERPL-MCNC: 0.64 MG/DL (ref 0.6–1.3)
EOSINOPHIL # BLD AUTO: 0.11 THOUSAND/ΜL (ref 0–0.61)
EOSINOPHIL NFR BLD AUTO: 1 % (ref 0–6)
ERYTHROCYTE [DISTWIDTH] IN BLOOD BY AUTOMATED COUNT: 14.4 % (ref 11.6–15.1)
EST. AVERAGE GLUCOSE BLD GHB EST-MCNC: 120 MG/DL
GFR SERPL CREATININE-BSD FRML MDRD: 107 ML/MIN/1.73SQ M
GLUCOSE P FAST SERPL-MCNC: 99 MG/DL (ref 65–99)
HBA1C MFR BLD: 5.8 % (ref 4.2–6.3)
HCT VFR BLD AUTO: 38 % (ref 34.8–46.1)
HDLC SERPL-MCNC: 48 MG/DL (ref 40–60)
HGB BLD-MCNC: 12.1 G/DL (ref 11.5–15.4)
IMM GRANULOCYTES # BLD AUTO: 0.03 THOUSAND/UL (ref 0–0.2)
IMM GRANULOCYTES NFR BLD AUTO: 0 % (ref 0–2)
LDLC SERPL CALC-MCNC: 159 MG/DL (ref 0–100)
LYMPHOCYTES # BLD AUTO: 1.93 THOUSANDS/ΜL (ref 0.6–4.47)
LYMPHOCYTES NFR BLD AUTO: 25 % (ref 14–44)
MCH RBC QN AUTO: 25.2 PG (ref 26.8–34.3)
MCHC RBC AUTO-ENTMCNC: 31.8 G/DL (ref 31.4–37.4)
MCV RBC AUTO: 79 FL (ref 82–98)
MONOCYTES # BLD AUTO: 0.48 THOUSAND/ΜL (ref 0.17–1.22)
MONOCYTES NFR BLD AUTO: 6 % (ref 4–12)
NEUTROPHILS # BLD AUTO: 5.3 THOUSANDS/ΜL (ref 1.85–7.62)
NEUTS SEG NFR BLD AUTO: 68 % (ref 43–75)
NRBC BLD AUTO-RTO: 0 /100 WBCS
PLATELET # BLD AUTO: 215 THOUSANDS/UL (ref 149–390)
PMV BLD AUTO: 11.1 FL (ref 8.9–12.7)
POTASSIUM SERPL-SCNC: 3.7 MMOL/L (ref 3.5–5.3)
RBC # BLD AUTO: 4.8 MILLION/UL (ref 3.81–5.12)
SODIUM SERPL-SCNC: 135 MMOL/L (ref 136–145)
TRIGL SERPL-MCNC: 111 MG/DL
WBC # BLD AUTO: 7.88 THOUSAND/UL (ref 4.31–10.16)

## 2019-01-10 PROCEDURE — 83036 HEMOGLOBIN GLYCOSYLATED A1C: CPT

## 2019-01-10 PROCEDURE — 80048 BASIC METABOLIC PNL TOTAL CA: CPT

## 2019-01-10 PROCEDURE — 36415 COLL VENOUS BLD VENIPUNCTURE: CPT

## 2019-01-10 PROCEDURE — 85025 COMPLETE CBC W/AUTO DIFF WBC: CPT

## 2019-01-10 PROCEDURE — 80061 LIPID PANEL: CPT

## 2019-01-11 DIAGNOSIS — E78.5 DYSLIPIDEMIA: Primary | ICD-10-CM

## 2019-01-11 RX ORDER — ATORVASTATIN CALCIUM 20 MG/1
20 TABLET, FILM COATED ORAL DAILY
Qty: 90 TABLET | Refills: 0 | Status: SHIPPED | OUTPATIENT
Start: 2019-01-11 | End: 2019-10-21

## 2019-01-16 NOTE — PROGRESS NOTES
Does Not need an appointment yet - please encouraged her to follow low-cholesterol diet, continue fish oil and add 30 minutes of exercise 3 to 5 times a week - re-evaluate after next set of labs in 3 months

## 2019-01-18 DIAGNOSIS — M79.10 MUSCLE PAIN: ICD-10-CM

## 2019-01-18 DIAGNOSIS — I10 HYPERTENSION, UNSPECIFIED TYPE: ICD-10-CM

## 2019-01-19 DIAGNOSIS — M79.10 MUSCLE PAIN: ICD-10-CM

## 2019-01-21 RX ORDER — BENAZEPRIL HYDROCHLORIDE 5 MG/1
5 TABLET, FILM COATED ORAL DAILY
Qty: 90 TABLET | Refills: 0 | Status: SHIPPED | OUTPATIENT
Start: 2019-01-21 | End: 2019-10-21 | Stop reason: SDUPTHER

## 2019-01-21 RX ORDER — TIZANIDINE 2 MG/1
4 TABLET ORAL EVERY 8 HOURS PRN
Qty: 30 TABLET | Refills: 1 | Status: SHIPPED | OUTPATIENT
Start: 2019-01-21

## 2019-01-21 RX ORDER — TIZANIDINE 2 MG/1
TABLET ORAL
Qty: 30 TABLET | Refills: 0 | OUTPATIENT
Start: 2019-01-21

## 2019-06-22 DIAGNOSIS — I10 HYPERTENSION, UNSPECIFIED TYPE: ICD-10-CM

## 2019-06-24 RX ORDER — BENAZEPRIL HYDROCHLORIDE 5 MG/1
TABLET, FILM COATED ORAL
Qty: 90 TABLET | Refills: 0 | OUTPATIENT
Start: 2019-06-24

## 2019-06-24 NOTE — TELEPHONE ENCOUNTER
Patient established care with a PCP at [facility] on 5/3/19  Please remove from my list   Patient will need to contact her new PCP for refills

## 2019-07-14 DIAGNOSIS — I10 HYPERTENSION, UNSPECIFIED TYPE: ICD-10-CM

## 2019-07-17 RX ORDER — BENAZEPRIL HYDROCHLORIDE 5 MG/1
TABLET, FILM COATED ORAL
Qty: 30 TABLET | Refills: 0 | OUTPATIENT
Start: 2019-07-17

## 2019-07-19 DIAGNOSIS — M79.10 MUSCLE PAIN: ICD-10-CM

## 2019-07-21 DIAGNOSIS — M79.10 MUSCLE PAIN: ICD-10-CM

## 2019-07-22 RX ORDER — TIZANIDINE 2 MG/1
TABLET ORAL
Qty: 30 TABLET | Refills: 0 | OUTPATIENT
Start: 2019-07-22

## 2019-10-21 DIAGNOSIS — E78.5 DYSLIPIDEMIA: ICD-10-CM

## 2019-10-21 DIAGNOSIS — I10 HYPERTENSION, UNSPECIFIED TYPE: ICD-10-CM

## 2019-10-21 DIAGNOSIS — E78.5 HYPERLIPIDEMIA, UNSPECIFIED HYPERLIPIDEMIA TYPE: Primary | ICD-10-CM

## 2019-10-21 RX ORDER — BENAZEPRIL HYDROCHLORIDE 5 MG/1
TABLET, FILM COATED ORAL
Qty: 30 TABLET | Refills: 0 | Status: SHIPPED | OUTPATIENT
Start: 2019-10-21 | End: 2019-11-19 | Stop reason: SDUPTHER

## 2019-10-21 RX ORDER — LIRAGLUTIDE 6 MG/ML
INJECTION, SOLUTION SUBCUTANEOUS
Refills: 0 | COMMUNITY
Start: 2019-09-13

## 2019-10-21 RX ORDER — ROSUVASTATIN CALCIUM 10 MG/1
10 TABLET, COATED ORAL DAILY
COMMUNITY
Start: 2019-05-03 | End: 2019-10-21 | Stop reason: SDUPTHER

## 2019-10-21 RX ORDER — ROSUVASTATIN CALCIUM 10 MG/1
10 TABLET, COATED ORAL DAILY
Qty: 30 TABLET | Refills: 0 | Status: SHIPPED | OUTPATIENT
Start: 2019-10-21 | End: 2020-10-20

## 2019-10-21 NOTE — TELEPHONE ENCOUNTER
Needs appointment - sent 30 days of bp and cholesterol meds - start meds then get labs done - make appointment in 3 weeks  Has not been seen for a while

## 2019-10-23 NOTE — TELEPHONE ENCOUNTER
Patient advised of refills and she is also seeing another Provider with [facility]

## 2019-11-19 DIAGNOSIS — I10 HYPERTENSION, UNSPECIFIED TYPE: ICD-10-CM

## 2019-11-19 RX ORDER — BENAZEPRIL HYDROCHLORIDE 5 MG/1
TABLET, FILM COATED ORAL
Qty: 30 TABLET | Refills: 0 | Status: SHIPPED | OUTPATIENT
Start: 2019-11-19

## 2019-12-19 DIAGNOSIS — I10 HYPERTENSION, UNSPECIFIED TYPE: ICD-10-CM

## 2019-12-19 RX ORDER — BENAZEPRIL HYDROCHLORIDE 5 MG/1
TABLET, FILM COATED ORAL
Qty: 30 TABLET | Refills: 0 | OUTPATIENT
Start: 2019-12-19

## 2020-05-10 DIAGNOSIS — E78.5 DYSLIPIDEMIA: ICD-10-CM

## 2020-05-11 RX ORDER — ROSUVASTATIN CALCIUM 10 MG/1
TABLET, COATED ORAL
Qty: 30 TABLET | Refills: 0 | OUTPATIENT
Start: 2020-05-11

## 2021-03-26 ENCOUNTER — IMMUNIZATIONS (OUTPATIENT)
Dept: FAMILY MEDICINE CLINIC | Facility: HOSPITAL | Age: 51
End: 2021-03-26

## 2021-03-26 DIAGNOSIS — Z23 ENCOUNTER FOR IMMUNIZATION: Primary | ICD-10-CM

## 2021-03-26 PROCEDURE — 0001A SARS-COV-2 / COVID-19 MRNA VACCINE (PFIZER-BIONTECH) 30 MCG: CPT

## 2021-03-26 PROCEDURE — 91300 SARS-COV-2 / COVID-19 MRNA VACCINE (PFIZER-BIONTECH) 30 MCG: CPT

## 2021-04-16 ENCOUNTER — IMMUNIZATIONS (OUTPATIENT)
Dept: FAMILY MEDICINE CLINIC | Facility: HOSPITAL | Age: 51
End: 2021-04-16

## 2021-04-16 DIAGNOSIS — Z23 ENCOUNTER FOR IMMUNIZATION: Primary | ICD-10-CM

## 2021-04-16 PROCEDURE — 0002A SARS-COV-2 / COVID-19 MRNA VACCINE (PFIZER-BIONTECH) 30 MCG: CPT

## 2021-04-16 PROCEDURE — 91300 SARS-COV-2 / COVID-19 MRNA VACCINE (PFIZER-BIONTECH) 30 MCG: CPT

## 2025-07-28 ENCOUNTER — TELEPHONE (OUTPATIENT)
Age: 55
End: 2025-07-28